# Patient Record
Sex: FEMALE | Race: WHITE | NOT HISPANIC OR LATINO
[De-identification: names, ages, dates, MRNs, and addresses within clinical notes are randomized per-mention and may not be internally consistent; named-entity substitution may affect disease eponyms.]

---

## 2018-03-22 ENCOUNTER — RESULT REVIEW (OUTPATIENT)
Age: 83
End: 2018-03-22

## 2018-11-06 ENCOUNTER — APPOINTMENT (OUTPATIENT)
Dept: VASCULAR SURGERY | Facility: CLINIC | Age: 83
End: 2018-11-06
Payer: COMMERCIAL

## 2018-11-06 VITALS
SYSTOLIC BLOOD PRESSURE: 156 MMHG | DIASTOLIC BLOOD PRESSURE: 66 MMHG | TEMPERATURE: 98.1 F | WEIGHT: 96 LBS | BODY MASS INDEX: 18.85 KG/M2 | HEART RATE: 76 BPM | HEIGHT: 60 IN

## 2018-11-06 PROCEDURE — 93880 EXTRACRANIAL BILAT STUDY: CPT

## 2018-11-06 PROCEDURE — 99203 OFFICE O/P NEW LOW 30 MIN: CPT

## 2020-08-26 ENCOUNTER — APPOINTMENT (OUTPATIENT)
Dept: VASCULAR SURGERY | Facility: CLINIC | Age: 85
End: 2020-08-26
Payer: COMMERCIAL

## 2020-08-26 VITALS
SYSTOLIC BLOOD PRESSURE: 155 MMHG | WEIGHT: 92 LBS | DIASTOLIC BLOOD PRESSURE: 67 MMHG | BODY MASS INDEX: 18.06 KG/M2 | HEART RATE: 69 BPM | HEIGHT: 60 IN

## 2020-08-26 PROCEDURE — 99212 OFFICE O/P EST SF 10 MIN: CPT

## 2020-08-26 PROCEDURE — 93880 EXTRACRANIAL BILAT STUDY: CPT

## 2021-09-01 ENCOUNTER — APPOINTMENT (OUTPATIENT)
Dept: VASCULAR SURGERY | Facility: CLINIC | Age: 86
End: 2021-09-01
Payer: COMMERCIAL

## 2021-09-01 VITALS
HEART RATE: 74 BPM | WEIGHT: 92 LBS | BODY MASS INDEX: 18.06 KG/M2 | HEIGHT: 60 IN | SYSTOLIC BLOOD PRESSURE: 144 MMHG | TEMPERATURE: 96 F | DIASTOLIC BLOOD PRESSURE: 75 MMHG

## 2021-09-01 PROCEDURE — 93880 EXTRACRANIAL BILAT STUDY: CPT

## 2021-09-01 PROCEDURE — 99212 OFFICE O/P EST SF 10 MIN: CPT

## 2022-09-13 ENCOUNTER — APPOINTMENT (OUTPATIENT)
Dept: VASCULAR SURGERY | Facility: CLINIC | Age: 87
End: 2022-09-13

## 2022-09-13 VITALS
HEIGHT: 60 IN | HEART RATE: 72 BPM | SYSTOLIC BLOOD PRESSURE: 142 MMHG | TEMPERATURE: 96.8 F | BODY MASS INDEX: 18.06 KG/M2 | WEIGHT: 92 LBS | DIASTOLIC BLOOD PRESSURE: 66 MMHG

## 2022-09-13 DIAGNOSIS — Z86.39 PERSONAL HISTORY OF OTHER ENDOCRINE, NUTRITIONAL AND METABOLIC DISEASE: ICD-10-CM

## 2022-09-13 DIAGNOSIS — Z86.79 PERSONAL HISTORY OF OTHER DISEASES OF THE CIRCULATORY SYSTEM: ICD-10-CM

## 2022-09-13 DIAGNOSIS — I65.23 OCCLUSION AND STENOSIS OF BILATERAL CAROTID ARTERIES: ICD-10-CM

## 2022-09-13 PROCEDURE — 93880 EXTRACRANIAL BILAT STUDY: CPT

## 2022-09-13 PROCEDURE — 99213 OFFICE O/P EST LOW 20 MIN: CPT

## 2022-09-13 RX ORDER — SIMVASTATIN 80 MG/1
TABLET, FILM COATED ORAL
Refills: 0 | Status: ACTIVE | COMMUNITY

## 2022-09-13 RX ORDER — LEVOTHYROXINE SODIUM 100 UG/1
100 CAPSULE ORAL
Refills: 0 | Status: ACTIVE | COMMUNITY

## 2022-09-13 RX ORDER — UBIDECARENONE/VIT E ACET 100MG-5
CAPSULE ORAL
Refills: 0 | Status: ACTIVE | COMMUNITY

## 2022-09-13 RX ORDER — ATENOLOL 50 MG/1
TABLET ORAL
Refills: 0 | Status: ACTIVE | COMMUNITY

## 2022-09-13 RX ORDER — ASPIRIN 81 MG
81 TABLET,CHEWABLE ORAL
Refills: 0 | Status: ACTIVE | COMMUNITY

## 2022-09-13 RX ORDER — LOSARTAN POTASSIUM 100 MG/1
TABLET, FILM COATED ORAL
Refills: 0 | Status: ACTIVE | COMMUNITY

## 2022-09-14 PROBLEM — I65.23 ASYMPTOMATIC STENOSIS OF BOTH CAROTID ARTERIES WITHOUT INFARCTION: Status: ACTIVE | Noted: 2018-11-06

## 2022-09-14 NOTE — REVIEW OF SYSTEMS
[Negative] : Heme/Lymph [FreeTextEntry3] : Decreased vision secondary to macular degeneration - follows w/ ophtho

## 2022-09-14 NOTE — HISTORY OF PRESENT ILLNESS
[FreeTextEntry1] : Former patient of Dr. Leggett's, being following for asymptomatic carotid stenosis. \par \par + PMH: CAD, Aortic stenosis s/p TAVR (March 2021), hypothyroidism, macular degeneration\par + PSH: S/p TAVR\par + FH: Denies\par + SH: Non-smoker\par + Aller: NKDA\par \par PCP is Dr. Oz Evans. \par Cardiologist is Dr. Jones Dan.  [de-identified] : 9/13/2022 - Pt presents for follow up visit. Since last visit to office, patient denies any unilateral symptoms of numbness or weakness.  She denies any episodes of slurred speech.  Denies any amaurosis fugax.  She does note vision changes, secondary to her macular degeneration, for which she follows with an ophthalmologist.  She continues to take aspirin 81 mg daily.  She continues to take her simvastatin.  As well as her antihypertensives.

## 2022-09-14 NOTE — PHYSICAL EXAM
[2+] : left 2+ [Ankle Swelling (On Exam)] : not present [Calm] : calm [de-identified] : Well-appearing  [de-identified] : CN II-XII grossly intact [de-identified] : Soft, nontender, nondistended  [de-identified] : Motor and sensory intact in all 4 extremities [de-identified] : Alert and oriented x4

## 2022-09-14 NOTE — ASSESSMENT
[FreeTextEntry1] : MORIS RAIN is a 89 year old female presents for follow-up visit\par \par > Bilateral carotid stenosis, asymptomatic, mild\par – Reviewed results of duplex with patient.  There is less than 50% bilateral ICA stenosis.  There is antegrade flow in the bilateral vertebrals.\par – Continue medical management of carotid stenosis with the continuation of aspirin, simvastatin, antihypertensives.  Discussed with the patient symptoms of stroke.\par – Follow-up in 1 year for repeat carotid duplex.

## 2022-09-14 NOTE — CONSULT LETTER
[Dear  ___] : Dear  [unfilled], [Courtesy Letter:] : I had the pleasure of seeing your patient, [unfilled], in my office today. [Please see my note below.] : Please see my note below. [Consult Closing:] : Thank you very much for allowing me to participate in the care of this patient.  If you have any questions, please do not hesitate to contact me. [Sincerely,] : Sincerely, [FreeTextEntry1] : She had been following with Dr. Leggett for bilateral asymptomatic carotid stenosis.  She presents today for follow-up.  She denies any symptoms of stroke or TIA.  On exam, she remains neurologically intact.  A repeat carotid duplex shows less than 50% bilateral ICA stenosis with bilateral vertebral antegrade flow.  I reviewed these results with the patient.  I am recommending continued medical management with the continuation of aspirin, simvastatin, antihypertensive.  I plan to see her again in 1 year for repeat duplex.  I am including a copy of her results for your records. [FreeTextEntry3] : \par \par \par \par Francisca Nobles MD, RPVI\par Division of Vascular & Endovascular Surgery\par [facility], Department of Surgery \par \par CC. Dr. Jones Dan.

## 2022-09-14 NOTE — DATA REVIEWED
[FreeTextEntry1] : 9/13/2022–bilateral carotid duplex\par Right less than 50% ICA stenosis\par Left less than 50% ICA stenosis\par Bilateral vertebrals are antegrade flow

## 2023-09-19 ENCOUNTER — APPOINTMENT (OUTPATIENT)
Dept: VASCULAR SURGERY | Facility: CLINIC | Age: 88
End: 2023-09-19

## 2023-09-25 ENCOUNTER — APPOINTMENT (OUTPATIENT)
Dept: VASCULAR SURGERY | Facility: CLINIC | Age: 88
End: 2023-09-25

## 2023-11-01 ENCOUNTER — APPOINTMENT (OUTPATIENT)
Dept: OPHTHALMOLOGY | Facility: CLINIC | Age: 88
End: 2023-11-01

## 2023-11-03 ENCOUNTER — APPOINTMENT (OUTPATIENT)
Dept: OPHTHALMOLOGY | Facility: CLINIC | Age: 88
End: 2023-11-03
Payer: COMMERCIAL

## 2023-11-03 ENCOUNTER — NON-APPOINTMENT (OUTPATIENT)
Age: 88
End: 2023-11-03

## 2023-11-03 PROCEDURE — 92134 CPTRZ OPH DX IMG PST SGM RTA: CPT

## 2023-11-03 PROCEDURE — 92025 CPTRIZED CORNEAL TOPOGRAPHY: CPT

## 2023-11-03 PROCEDURE — 92004 COMPRE OPH EXAM NEW PT 1/>: CPT

## 2024-05-10 ENCOUNTER — NON-APPOINTMENT (OUTPATIENT)
Age: 89
End: 2024-05-10

## 2024-05-10 ENCOUNTER — APPOINTMENT (OUTPATIENT)
Dept: OPHTHALMOLOGY | Facility: CLINIC | Age: 89
End: 2024-05-10
Payer: COMMERCIAL

## 2024-05-10 PROCEDURE — 92012 INTRM OPH EXAM EST PATIENT: CPT

## 2024-10-02 ENCOUNTER — APPOINTMENT (OUTPATIENT)
Dept: OTOLARYNGOLOGY | Facility: CLINIC | Age: 89
End: 2024-10-02

## 2024-11-08 ENCOUNTER — APPOINTMENT (OUTPATIENT)
Dept: OPHTHALMOLOGY | Facility: CLINIC | Age: 89
End: 2024-11-08

## 2025-02-07 ENCOUNTER — APPOINTMENT (OUTPATIENT)
Dept: OTOLARYNGOLOGY | Facility: CLINIC | Age: 89
End: 2025-02-07
Payer: COMMERCIAL

## 2025-02-07 VITALS
WEIGHT: 87 LBS | SYSTOLIC BLOOD PRESSURE: 168 MMHG | HEIGHT: 59 IN | HEART RATE: 73 BPM | TEMPERATURE: 97.7 F | BODY MASS INDEX: 17.54 KG/M2 | DIASTOLIC BLOOD PRESSURE: 63 MMHG

## 2025-02-07 DIAGNOSIS — H61.23 IMPACTED CERUMEN, BILATERAL: ICD-10-CM

## 2025-02-07 DIAGNOSIS — H90.5 UNSPECIFIED SENSORINEURAL HEARING LOSS: ICD-10-CM

## 2025-02-07 PROCEDURE — 99203 OFFICE O/P NEW LOW 30 MIN: CPT | Mod: 25

## 2025-02-07 PROCEDURE — 92567 TYMPANOMETRY: CPT

## 2025-02-07 PROCEDURE — 92557 COMPREHENSIVE HEARING TEST: CPT
